# Patient Record
Sex: FEMALE | Race: WHITE
[De-identification: names, ages, dates, MRNs, and addresses within clinical notes are randomized per-mention and may not be internally consistent; named-entity substitution may affect disease eponyms.]

---

## 2018-01-09 ENCOUNTER — HOSPITAL ENCOUNTER (EMERGENCY)
Dept: HOSPITAL 17 - NEPK | Age: 63
Discharge: HOME | End: 2018-01-09
Payer: SELF-PAY

## 2018-01-09 VITALS
SYSTOLIC BLOOD PRESSURE: 167 MMHG | OXYGEN SATURATION: 97 % | TEMPERATURE: 97.8 F | HEART RATE: 106 BPM | DIASTOLIC BLOOD PRESSURE: 86 MMHG | RESPIRATION RATE: 14 BRPM

## 2018-01-09 DIAGNOSIS — S52.125A: Primary | ICD-10-CM
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
## 2018-01-09 PROCEDURE — 73080 X-RAY EXAM OF ELBOW: CPT

## 2018-01-09 PROCEDURE — 99283 EMERGENCY DEPT VISIT LOW MDM: CPT

## 2018-01-09 NOTE — RADRPT
EXAM DATE/TIME:  01/09/2018 15:30 

 

[facility] COMPARISON:     

No previous studies available for comparison.

 

                     

INDICATIONS :     

[place] the beach today, pain in left elbow, pain is most severe when elbow is extended

                     

 

MEDICAL HISTORY :     

None.          

 

SURGICAL HISTORY :     

None.   

 

ENCOUNTER:     

Initial                                        

 

ACUITY:     

1 day      

 

PAIN SCORE:     

10/10

 

LOCATION:     

Left  elbow

 

FINDINGS:     

Extended 4 view examination of the left elbow was obtained. There is a fat-pad sign consistent with a
 joint effusion. There is mild degenerative change in the ulnar trochlear joint with slight spurring.
 Radial head appears intact with no visualized fracture. There is mild osteopenia. The soft tissues a
re otherwise unremarkable.

 

CONCLUSION:     

1. Evidence of joint effusion with no visualized fracture. This is of concern for possible occult fra
cture.

2. 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

 

 

 

 

 Paul Stephenson MD on January 09, 2018 at 16:04           

Board Certified Radiologist.

 This report was verified electronically.

## 2018-01-09 NOTE — PD
HPI


Chief Complaint:  Musculoskeletal Complaint


Time Seen by Provider:  15:20


Travel History


International Travel<30 days:  No


Contact w/Intl Traveler<30days:  No


Traveled to known affect area:  No





History of Present Illness


HPI


62-year-old right-hand dominant female presents to ED for evaluation 8/10 left 

elbow pain.  Onset round 10:30 AM after the patient fell on the beach.  Pain is 

constant, worsened by range of motion.  She denies numbness, tingling, weakness 

of the extremity.  She endorses limitations to range of motion motion secondary 

to pain.  She's never injured this area before.  She states that she treated at 

home with ice with no improvement in symptoms which prompted her to seek 

evaluation at the ED.





Formerly [facility], 1928–1983


Past Medical History


Medical History:  Denies Significant Hx


Tetanus Vaccination:  Unknown





Past Surgical History


Surgical History:  No Previous Surgery





Social History


Alcohol Use:  No


Tobacco Use:  Yes


Substance Use:  No





Allergies-Medications


(Allergen,Severity, Reaction):  


Coded Allergies:  


     No Known Allergies (Unverified , 1/9/18)


Reported Meds & Prescriptions





Reported Meds & Active Scripts


Active


No Active Prescriptions or Reported Medications    








Review of Systems


Except as stated in HPI:  all other systems reviewed are Neg





Physical Exam


Narrative


GENERAL: Well-nourished, well-developed white female in no acute distress.


SKIN: Focused skin assessment warm/dry.


HEAD: Normocephalic.


EYES: No scleral icterus. No injection or drainage. 


NECK: Supple, trachea midline. No JVD or lymphadenopathy.


CARDIOVASCULAR: Regular rate and rhythm without murmurs, gallops, or rubs. 


RESPIRATORY: Breath sounds equal bilaterally. No accessory muscle use.


GASTROINTESTINAL: Abdomen soft, non-tender, nondistended. 


MUSCULOSKELETAL: No cyanosis, or edema. 


FOCUSED LEFT UPPER EXTREMITY EXAM: 2+ radial pulse.  Strong  strength.  

Tender to palpation of the radial head.  Patient is unable to extend to 0.  

Neurovascularly intact distally.


BACK: Nontender without obvious deformity. No CVA tenderness.





Data


Data


Last Documented VS





Vital Signs








  Date Time  Temp Pulse Resp B/P (MAP) Pulse Ox O2 Delivery O2 Flow Rate FiO2


 


1/9/18 14:27 97.8 106 14 167/86 (113) 97   








Orders





 Orders


Elbow, Complete (4 Vws) (1/9/18 )


Acetamin-Hydrocod 325-5 Mg (Norco  5-325 (1/9/18 15:45)


Sling Cradle Arm (1/9/18 )


Ed Discharge Order (1/9/18 16:31)








[facility]


Medical Decision Making


Medical Screen Exam Complete:  Yes


Emergency Medical Condition:  Yes


Differential Diagnosis


Contusion versus fracture versus dislocation versus other


Narrative Course


62-year-old right-hand dominant female presents to ED for evaluation 8/10 left 

elbow pain.  Onset round 10:30 AM after the patient fell on the beach. She 

endorses limitations to range of motion motion secondary to pain.  She tried 

treating with ice and had no improvement so decided to come to the ED.  Vitals 

reviewed.  Physical exam reveals a pleasant white female in no acute distress.  

Chest tenderness to palpation of the radial head and is unable to extend the 

arm to 0.  Exam otherwise unremarkable.  Ice pack was applied.  Patient was 

administered 5 mg Lortab by mouth.  X-rays reveal no obvious fracture but there 

is a definitive sale sign.  This is fracture of the radial head.  Patient is 

provided a sling.  She is visiting from [place] and states she will follow up 

with an orthopedist when she returns in a few days.  She is instructed to use 

OTC medications as prescribed on the label, limit use of the arm until cleared 

by the orthopedist.  She indicated understanding of  the instructions.  She is 

stable and discharged home.





Diagnosis





 Primary Impression:  


 Fracture of radial head, left, closed


 Qualified Codes:  S52.125A - Nondisplaced fracture of head of left radius, 

initial encounter for closed fracture


Referrals:  


Jose Goldman MD


Patient Instructions:  Elbow Fracture (ED), General Instructions





***Additional Instructions:  


Rest, ice, elevate the extremity.


Apply ice no longer than 10-15 minutes per hour a few times a day.


800 mg ibuprofen up to 3 times a day as needed for pain.


Keep the arm in the sling except when showering until cleared by the 

orthopedist.


Follow-up with Dr. Brooke or the orthopedist of your choice this week.


Return to the ED for any urgent or emergent medical condition.


Scripts


No Active Prescriptions or Reported Meds


Disposition:  01 DISCHARGE HOME


Condition:  Stable











Reyna Felix Jan 9, 2018 15:46